# Patient Record
(demographics unavailable — no encounter records)

---

## 2024-12-31 NOTE — PHYSICAL EXAM
[FreeTextEntry1] : Gait: Presents ambulating independently without signs of antalgia. Good coordination and balance noted. GENERAL: alert, cooperative, in NAD SKIN: The skin is intact, warm, pink and dry over the area examined. EYES: Normal conjunctiva, normal eyelids and pupils were equal and round. ENT: normal ears, normal nose and normal lips. CARDIOVASCULAR: brisk capillary refill, but no peripheral edema. RESPIRATORY: The patient is in no apparent respiratory distress. They're taking full deep breaths without use of accessory muscles or evidence of audible wheezes or stridor without the use of a stethoscope. Normal respiratory effort. ABDOMEN: not examined  Bilateral Knees: No bony deformities, signs of trauma, or erythema noted. No visible effusion, muscle atrophy or asymmetry. No tenderness in bony prominences or soft tissue. No joint line, MCL, LCL, patellar tendon, or quadriceps tendon tenderness. Full active and passive range of motion of the knee. Toes are warm, pink, and moving freely. Strength is 5/5. Sensation is intact to light touch distally. Patellar reflex +2 B/L. Brisk capillary refill in all toes. No joint laxity palpable. Joint is stable with varus and valgus stress. Negative Lachmann test, negative anterior and posterior drawer with solid end point. Negative Gianni test. Negative patellar grind and patellar apprehension test. No abnormal findings on ankle or hip examination.

## 2024-12-31 NOTE — ASSESSMENT
[FreeTextEntry1] : Clayton is a 4 year old male with bilateral knee pain, likely atypical growing pains.   Today's assessment was performed with the assistance of the patient's parent as an independent historian given the patient's age, who could not be considered a reliable history/due to the nonverbal nature given the patient's young age. Clinical findings were reviewed at length with the patient and parent. The condition, natural history, and prognosis were explained to the patient and family. At this time, no acute orthopedic intervention is necessary. Discussed with mother that his symptoms are likely to be atypical growing pains and should resolve by the time patient reaches 8 years of age. Reassurance was provided to patient's mother. Offered radiographs to confirm the above, however, the mother wished to hold off on XR and will obtain routine blood testing from pediatrician to rule out other causes for the atypical pain.  He may continue to participate in all activities without restrictions. Clayton may follow up in the office on an as needed basis for this issue at this time or return if any new concerns should arise.  All questions and concerns were addressed today. Parent and patient verbalize understanding and agree with plan of care.  I, Fouzia Blevins PA-C, have acted as a scribe and documented the above information for Dr. Jay. The above documentation completed by the scribe is an accurate record of both my words and actions.  DENAE

## 2024-12-31 NOTE — REVIEW OF SYSTEMS
[Joint Pains] : arthralgias [Appropriate Age Development] : development appropriate for age [Change in Activity] : no change in activity [Fever Above 102] : no fever [Malaise] : no malaise [Rash] : no rash [Itching] : no itching [Eye Pain] : no eye pain [Redness] : no redness [Nasal Stuffiness] : no nasal congestion [Sore Throat] : no sore throat [Heart Problems] : no heart problems [Tachypnea] : no tachypnea [Wheezing] : no wheezing [Change in Appetite] : no change in appetite [Vomiting] : no vomiting [Limping] : no limping [Joint Swelling] : no joint swelling [Muscle Aches] : no muscle aches

## 2024-12-31 NOTE — HISTORY OF PRESENT ILLNESS
[FreeTextEntry1] : Clayton is a 4 year old, otherwise healthy, male presenting to the office today with his mother for initial pediatric orthopedic evaluation of bilateral knee pain. Mother reports that for a few months now, since the summer, he has been complaining of bilateral knee pain. There has been no know injury or trauma. Mother denies that the pain is not localized to a specific time of day. It is not increased at nighttime or waking him from sleep. He has continued to participate in all activities without restrictions. He is running, jumping, and playing with no difficulty. Mother reports that he complains of his pain prior to walking long distances, so is unsure if it is related to secondary gain. Mother denies any swelling, bruising, or overlying skin changes about the area. Denies recent fever, chills, weight loss, or other constitutional symptoms. Patient denies numbness or tingling. Here today for further orthopedic evaluation.